# Patient Record
Sex: FEMALE | Race: WHITE | NOT HISPANIC OR LATINO | Employment: FULL TIME | URBAN - METROPOLITAN AREA
[De-identification: names, ages, dates, MRNs, and addresses within clinical notes are randomized per-mention and may not be internally consistent; named-entity substitution may affect disease eponyms.]

---

## 2017-03-30 DIAGNOSIS — I10 ESSENTIAL HYPERTENSION: ICD-10-CM

## 2017-03-30 RX ORDER — HYDROCHLOROTHIAZIDE 25 MG/1
TABLET ORAL
Qty: 30 TABLET | Refills: 8 | OUTPATIENT
Start: 2017-03-30

## 2017-04-03 PROBLEM — I10 ESSENTIAL HYPERTENSION: Status: ACTIVE | Noted: 2017-04-03

## 2017-04-04 ENCOUNTER — OFFICE VISIT (OUTPATIENT)
Dept: FAMILY MEDICINE | Facility: CLINIC | Age: 32
End: 2017-04-04
Payer: COMMERCIAL

## 2017-04-04 VITALS
WEIGHT: 173.31 LBS | HEART RATE: 69 BPM | SYSTOLIC BLOOD PRESSURE: 150 MMHG | TEMPERATURE: 98 F | HEIGHT: 64 IN | DIASTOLIC BLOOD PRESSURE: 100 MMHG | BODY MASS INDEX: 29.59 KG/M2

## 2017-04-04 DIAGNOSIS — I10 ESSENTIAL HYPERTENSION: Primary | ICD-10-CM

## 2017-04-04 PROCEDURE — 99999 PR PBB SHADOW E&M-EST. PATIENT-LVL III: CPT | Mod: PBBFAC,,, | Performed by: FAMILY MEDICINE

## 2017-04-04 PROCEDURE — 3080F DIAST BP >= 90 MM HG: CPT | Mod: S$GLB,,, | Performed by: FAMILY MEDICINE

## 2017-04-04 PROCEDURE — 3077F SYST BP >= 140 MM HG: CPT | Mod: S$GLB,,, | Performed by: FAMILY MEDICINE

## 2017-04-04 PROCEDURE — 1160F RVW MEDS BY RX/DR IN RCRD: CPT | Mod: S$GLB,,, | Performed by: FAMILY MEDICINE

## 2017-04-04 PROCEDURE — 99213 OFFICE O/P EST LOW 20 MIN: CPT | Mod: S$GLB,,, | Performed by: FAMILY MEDICINE

## 2017-04-04 RX ORDER — NORGESTIMATE AND ETHINYL ESTRADIOL 0.25-0.035
1 KIT ORAL
Status: ON HOLD | COMMUNITY
Start: 2017-01-10 | End: 2018-09-18 | Stop reason: CLARIF

## 2017-04-04 RX ORDER — ATENOLOL AND CHLORTHALIDONE TABLET 50; 25 MG/1; MG/1
1 TABLET ORAL DAILY
Qty: 30 TABLET | Refills: 11 | Status: ON HOLD | OUTPATIENT
Start: 2017-04-04 | End: 2018-09-18 | Stop reason: CLARIF

## 2017-04-04 NOTE — PROGRESS NOTES
Here for f/u on HTN.  Tolerating HCTZ 25mg daily    Still considering trying to conceive.  She has been getting high readings over the last 2 weeks.     She did start new OCP in January.      Exercising without symptoms.                                                                               Past Medical History:   Diagnosis Date    Daily headache     some migraines    HTN (hypertension)        No past surgical history on file.    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      REVIEW OF SYSTEMS:  No recent changes in weight, or complaints of fatigue. No recent changes in vision, or hearing. Daily headaches.No recent changes in voice. No new or changing skin lesions. Denies abnormal bruising or bleeding.  Denies chest pain or sensation of skipped beats. No new onset of shortness of breath, or dyspnea on exertion. Denies abdominal discomfort, constipation, diarrhea,or blood in stool. Denies difficulty with urination.   No recent joint swelling or muscle discomfort. Denies pain or weakness in extremeties. No recent loss of balance. Denies problems with sleep or depression.        Remainder of the review of systems without pertinent positves at this time.                                                                              PHYSICAL EXAM:   VITAL SIGNS: BP (!) 150/100 (BP  "Location: Left arm, Patient Position: Sitting, BP Method: Manual)  Pulse 69  Temp 98.3 °F (36.8 °C) (Oral)   Ht 5' 4" (1.626 m)  Wt 78.6 kg (173 lb 4.5 oz)  LMP 03/23/2017 (Approximate)  BMI 29.74 kg/m2  GENERAL APPEARANCE:  Well nourished and normally developed,  pleasant 31 y.o. female, in good spirits.  SKIN: Without rashes or overt lesions.  HEENT: Head normacephalic. There was no scleral icterus. Mucous membranes were moist. Dentition. Neck is supple, no thyromegally, or carotid bruits.  LUNGS: Clear to auscultation bilaterally. Normal respiratory effort.  HEART: Exam reveals regular rate and rhythm. First and second heart sounds normal. No murmurs, rubs or gallops.   EXTREMITIES:  Nonedematous, both femoral and pedal pulses are normal. No joint stiffness or tenderness. Full range of motion and strength, upper and lower bilaterally.    ASSESSMENT/RECOMMENDATIONS :  Essential hypertension  -     atenolol-chlorthalidone (TENORETIC) 50-25 mg Tab; Take 1 tablet by mouth once daily.  Dispense: 30 tablet; Refill: 11    stop HCTZ  Begin tenoretic 50/25mg daily  F/u in 1 month for executive health checkup with lans  Continue to work on regular exercise, maintenance of a healthy weight, balanced diet rich in fruits/vegetables and lean protein, and avoidance of unhealthy habits like smoking and excessive alcohol intake.    "

## 2017-04-04 NOTE — MR AVS SNAPSHOT
[facility]  [address] [facility] [address]  [place] [address]  Phone: [phone]  Fax: [phone]                  Jaki Leonardo   2017 9:20 AM   Office Visit    Description:  Female : 1985   Provider:  Scott Hernandez MD   Department:  [facility]           Reason for Visit     Hypertension           Diagnoses this Visit        Comments    Essential hypertension    -  Primary            To Do List           Goals (5 Years of Data)     None      Follow-Up and Disposition     Return if symptoms worsen or fail to improve.       These Medications        Disp Refills Start End    atenolol-chlorthalidone (TENORETIC) 50-25 mg Tab 30 tablet 11 2017    Take 1 tablet by mouth once daily. - Oral    Pharmacy: [facility] [id number] [facility] - [place], [place] -  [facility]  Ph #: [phone]         [facility]     [facility] On Call Nurse Care Line -  Assistance  Unless otherwise directed by your provider, please contact [facility], our nurse care line that is available for  assistance.     Registered nurses in the [facility] provide: appointment scheduling, clinical advisement, health education, and other advisory services.  Call: [phone] (toll free)               Medications           Message regarding Medications     Verify the changes and/or additions to your medication regime listed below are the same as discussed with your clinician today.  If any of these changes or additions are incorrect, please notify your healthcare provider.        START taking these NEW medications        Refills    atenolol-chlorthalidone (TENORETIC) 50-25 mg Tab 11    Sig: Take 1 tablet by mouth once daily.    Class: Normal    Route: Oral      STOP taking these medications     hydrochlorothiazide (HYDRODIURIL) 25 MG tablet Take 1 tablet (25 mg total) by mouth once daily.           Verify that the below list of medications is an accurate  "representation of the medications you are currently taking.  If none reported, the list may be blank. If incorrect, please contact your healthcare provider. Carry this list with you in case of emergency.           Current Medications     norgestimate-ethinyl estradiol (ORTHO-CYCLEN) 0.25-35 mg-mcg per tablet Take 1 tablet by mouth.    atenolol-chlorthalidone (TENORETIC) 50-25 mg Tab Take 1 tablet by mouth once daily.           Clinical Reference Information           Your Vitals Were     BP Pulse Temp Height    150/100 (BP Location: Left arm, Patient Position: Sitting, BP Method: Manual) 69 98.3 °F (36.8 °C) (Oral) 5' 4" (1.626 m)    Weight Last Period BMI    78.6 kg (173 lb 4.5 oz) 03/23/2017 (Approximate) 29.74 kg/m2      Blood Pressure          Most Recent Value    BP  (!)  150/100      Allergies as of 4/4/2017     No Known Allergies      Immunizations Administered on Date of Encounter - 4/4/2017     None      [facility] Sign-Up     Activating your [facility] account is as easy as 1-2-3!     1) Visit [email or web address], select Sign Up Now, enter this activation code and your date of birth, then select Next.  [email or web address]  Expires: 5/19/2017  9:50 AM      2) Create a username and password to use when you visit [facility] in the future and select a security question in case you lose your password and select Next.    3) Enter your e-mail address and click Sign Up!    Additional Information  If you have questions, please e-mail [email or web address] or call [phone] to talk to our [facility] staff. Remember, [facility] is NOT to be used for urgent needs. For medical emergencies, dial 911.         Language Assistance Services     ATTENTION: Language assistance services are available, free of charge. Please call [phone].      ATENCIÓN: Si habla español, tiene a arciniega disposición servicios gratuitos de asistencia lingüística. Llame al [phone].     CHÚ Ý: N?u b?n nói Ti?ng Vi?t, có các d?ch v? h? tr? ngôn ng? " mi?n phí dành cho b?n. G?i s? [phone].         [facility] complies with applicable Federal civil rights laws and does not discriminate on the basis of race, color, national origin, age, disability, or sex.

## 2017-04-05 ENCOUNTER — TELEPHONE (OUTPATIENT)
Dept: ADMINISTRATIVE | Facility: HOSPITAL | Age: 32
End: 2017-04-05

## 2018-09-06 PROBLEM — O16.9 HYPERTENSION AFFECTING PREGNANCY: Status: ACTIVE | Noted: 2018-09-06

## 2018-09-11 PROBLEM — I10 HYPERTENSION: Status: ACTIVE | Noted: 2018-09-11

## 2018-09-28 PROBLEM — O14.90 PRE-ECLAMPSIA: Status: ACTIVE | Noted: 2018-09-28

## 2018-10-07 PROBLEM — M54.9 BACK PAIN AFFECTING PREGNANCY: Status: ACTIVE | Noted: 2018-10-07

## 2018-10-07 PROBLEM — O99.891 BACK PAIN AFFECTING PREGNANCY: Status: ACTIVE | Noted: 2018-10-07

## 2018-10-11 PROBLEM — O28.8 NST (NON-STRESS TEST) NONREACTIVE: Status: ACTIVE | Noted: 2018-10-11

## 2018-10-24 PROBLEM — Z34.90 ENCOUNTER FOR INDUCTION OF LABOR: Status: ACTIVE | Noted: 2018-10-24

## 2021-01-18 PROBLEM — O10.919 CHRONIC HYPERTENSION DURING PREGNANCY: Status: ACTIVE | Noted: 2021-01-18

## 2021-01-21 PROBLEM — O13.9 GESTATIONAL HYPERTENSION: Status: ACTIVE | Noted: 2021-01-21

## 2021-01-25 PROBLEM — I10 HTN (HYPERTENSION): Status: ACTIVE | Noted: 2021-01-25

## 2021-01-28 PROBLEM — O16.3 ELEVATED BLOOD PRESSURE AFFECTING PREGNANCY IN THIRD TRIMESTER, ANTEPARTUM: Status: ACTIVE | Noted: 2021-01-28

## 2021-02-17 PROBLEM — O09.523 AMA (ADVANCED MATERNAL AGE) MULTIGRAVIDA 35+, THIRD TRIMESTER: Status: ACTIVE | Noted: 2021-02-17

## 2021-07-28 ENCOUNTER — TELEPHONE (OUTPATIENT)
Dept: FAMILY MEDICINE | Facility: CLINIC | Age: 36
End: 2021-07-28